# Patient Record
Sex: MALE | Race: WHITE | Employment: FULL TIME | URBAN - METROPOLITAN AREA
[De-identification: names, ages, dates, MRNs, and addresses within clinical notes are randomized per-mention and may not be internally consistent; named-entity substitution may affect disease eponyms.]

---

## 2017-03-15 ENCOUNTER — OFFICE VISIT (OUTPATIENT)
Dept: FAMILY MEDICINE CLINIC | Facility: CLINIC | Age: 47
End: 2017-03-15

## 2017-03-15 VITALS — DIASTOLIC BLOOD PRESSURE: 80 MMHG | SYSTOLIC BLOOD PRESSURE: 122 MMHG | WEIGHT: 163 LBS

## 2017-03-15 DIAGNOSIS — K64.0 FIRST DEGREE HEMORRHOIDS: Primary | ICD-10-CM

## 2017-03-15 DIAGNOSIS — S03.00XA TMJ (DISLOCATION OF TEMPOROMANDIBULAR JOINT), INITIAL ENCOUNTER: ICD-10-CM

## 2017-03-15 PROCEDURE — 99213 OFFICE O/P EST LOW 20 MIN: CPT | Performed by: FAMILY MEDICINE

## 2017-03-15 RX ORDER — NAPROXEN SODIUM 550 MG/1
550 TABLET ORAL 2 TIMES DAILY WITH MEALS
Qty: 60 TABLET | Refills: 1 | Status: SHIPPED | OUTPATIENT
Start: 2017-03-15 | End: 2020-11-23

## 2017-03-15 NOTE — PROGRESS NOTES
[facility] Office Note  Chief Complaint:   Patient presents with:  Ear Problem: right bothering him when he chews, also c\o hemorrhoids      HPI:   This is a 55year old male coming in for pain r tmj    No results found hemorrhoids. BACK: No tenderness, no spasm, SLR test negative, FROM. EXTREMITIES:  No edema, no cyanosis, no clubbing, FROM, 2+ dorsalis pedis pulses bilaterally. NEURO:  No deficit, normal gait, strength and tone, sensory intact, normal reflexes.     AS

## 2017-03-16 ENCOUNTER — TELEPHONE (OUTPATIENT)
Dept: FAMILY MEDICINE CLINIC | Facility: CLINIC | Age: 47
End: 2017-03-16

## 2017-03-17 ENCOUNTER — TELEPHONE (OUTPATIENT)
Dept: FAMILY MEDICINE CLINIC | Facility: CLINIC | Age: 47
End: 2017-03-17

## 2017-03-20 ENCOUNTER — NURSE ONLY (OUTPATIENT)
Dept: FAMILY MEDICINE CLINIC | Facility: CLINIC | Age: 47
End: 2017-03-20

## 2017-03-20 DIAGNOSIS — Z00.00 BLOOD TESTS FOR ROUTINE GENERAL PHYSICAL EXAMINATION: Primary | ICD-10-CM

## 2017-03-20 LAB
ALBUMIN SERPL BCP-MCNC: 4.2 G/DL (ref 3.5–4.8)
ALBUMIN/GLOB SERPL: 1.4 {RATIO} (ref 1–2)
ALP SERPL-CCNC: 77 U/L (ref 32–100)
ALT SERPL-CCNC: 49 U/L (ref 17–63)
ANION GAP SERPL CALC-SCNC: 7 MMOL/L (ref 0–18)
AST SERPL-CCNC: 33 U/L (ref 15–41)
BASOPHILS # BLD: 0.1 K/UL (ref 0–0.2)
BASOPHILS NFR BLD: 1 %
BILIRUB SERPL-MCNC: 0.7 MG/DL (ref 0.3–1.2)
BUN SERPL-MCNC: 15 MG/DL (ref 8–20)
BUN/CREAT SERPL: 20 (ref 10–20)
CALCIUM SERPL-MCNC: 9.3 MG/DL (ref 8.5–10.5)
CHLORIDE SERPL-SCNC: 106 MMOL/L (ref 95–110)
CHOLEST SERPL-MCNC: 179 MG/DL (ref 110–200)
CO2 SERPL-SCNC: 26 MMOL/L (ref 22–32)
CREAT SERPL-MCNC: 0.75 MG/DL (ref 0.5–1.5)
EOSINOPHIL # BLD: 0.3 K/UL (ref 0–0.7)
EOSINOPHIL NFR BLD: 5 %
ERYTHROCYTE [DISTWIDTH] IN BLOOD BY AUTOMATED COUNT: 14.6 % (ref 11–15)
GLOBULIN PLAS-MCNC: 2.9 G/DL (ref 2.5–3.7)
GLUCOSE SERPL-MCNC: 100 MG/DL (ref 70–99)
HCT VFR BLD AUTO: 45 % (ref 41–52)
HDLC SERPL-MCNC: 44 MG/DL
HGB BLD-MCNC: 14.7 G/DL (ref 13.5–17.5)
LDLC SERPL CALC-MCNC: 106 MG/DL (ref 0–99)
LYMPHOCYTES # BLD: 2.5 K/UL (ref 1–4)
LYMPHOCYTES NFR BLD: 38 %
MCH RBC QN AUTO: 28.9 PG (ref 27–32)
MCHC RBC AUTO-ENTMCNC: 32.6 G/DL (ref 32–37)
MCV RBC AUTO: 88.8 FL (ref 80–100)
MONOCYTES # BLD: 0.5 K/UL (ref 0–1)
MONOCYTES NFR BLD: 8 %
NEUTROPHILS # BLD AUTO: 3.1 K/UL (ref 1.8–7.7)
NEUTROPHILS NFR BLD: 48 %
NONHDLC SERPL-MCNC: 135 MG/DL
OSMOLALITY UR CALC.SUM OF ELEC: 289 MOSM/KG (ref 275–295)
PLATELET # BLD AUTO: 227 K/UL (ref 140–400)
PMV BLD AUTO: 9.2 FL (ref 7.4–10.3)
POTASSIUM SERPL-SCNC: 4.7 MMOL/L (ref 3.3–5.1)
PROT SERPL-MCNC: 7.1 G/DL (ref 5.9–8.4)
RBC # BLD AUTO: 5.07 M/UL (ref 4.5–5.9)
SODIUM SERPL-SCNC: 139 MMOL/L (ref 136–144)
TRIGL SERPL-MCNC: 146 MG/DL (ref 1–149)
WBC # BLD AUTO: 6.5 K/UL (ref 4–11)

## 2017-03-20 PROCEDURE — 36415 COLL VENOUS BLD VENIPUNCTURE: CPT | Performed by: FAMILY MEDICINE

## 2017-03-20 PROCEDURE — 80053 COMPREHEN METABOLIC PANEL: CPT | Performed by: FAMILY MEDICINE

## 2017-03-20 PROCEDURE — 85025 COMPLETE CBC W/AUTO DIFF WBC: CPT | Performed by: FAMILY MEDICINE

## 2017-03-20 PROCEDURE — 80061 LIPID PANEL: CPT | Performed by: FAMILY MEDICINE

## 2017-04-10 ENCOUNTER — OFFICE VISIT (OUTPATIENT)
Dept: FAMILY MEDICINE CLINIC | Facility: CLINIC | Age: 47
End: 2017-04-10

## 2017-04-10 VITALS
SYSTOLIC BLOOD PRESSURE: 110 MMHG | BODY MASS INDEX: 25.9 KG/M2 | HEIGHT: 67 IN | DIASTOLIC BLOOD PRESSURE: 70 MMHG | WEIGHT: 165 LBS

## 2017-04-10 DIAGNOSIS — Z00.00 ROUTINE GENERAL MEDICAL EXAMINATION AT A HEALTH CARE FACILITY: Primary | ICD-10-CM

## 2017-04-10 PROCEDURE — 99396 PREV VISIT EST AGE 40-64: CPT | Performed by: FAMILY MEDICINE

## 2017-04-10 RX ORDER — OLOPATADINE HYDROCHLORIDE 2 MG/ML
2 SOLUTION/ DROPS OPHTHALMIC ONCE
Qty: 1 BOTTLE | Refills: 5 | Status: SHIPPED | OUTPATIENT
Start: 2017-04-10 | End: 2017-04-10

## 2017-04-10 NOTE — PROGRESS NOTES
CC: Annual Physical Exam    HPI:   Avani Riojas is a 55year old male who presents for a complete physical exam.    Wt Readings from Last 6 Encounters:  04/10/17 : 165 lb  03/15/17 : 163 lb    Body mass index is 25.84 kg/(m^2).        Results for orders emre Acetate 25 MG Rectal Suppos Place 1 suppository (25 mg total) rectally 2 (two) times daily.  Disp: 14 suppository Rfl: 2   FLUTICASONE PROPIONATE 50 MCG/ACT Nasal Suspension PLACE 2 SPRAYS NASALLY TWICE DAILY Disp: 1 Bottle Rfl: 0      No Known Allergies Height as of this encounter: 67\". Weight as of this encounter: 165 lb. Vital signs reviewed. Appears stated age, well groomed. Physical Exam:  GEN:  Patient is alert, awake and oriented, well developed, well nourished male, no apparent distress.   GEOVANNY encounter diagnosis)    Meds & Refills for this Visit:  No prescriptions requested or ordered in this encounter    Imaging & Consults:  None    The patient is asked to return in 6 months. Adam Snyder

## 2017-08-16 ENCOUNTER — OFFICE VISIT (OUTPATIENT)
Dept: FAMILY MEDICINE CLINIC | Facility: CLINIC | Age: 47
End: 2017-08-16

## 2017-08-16 VITALS
SYSTOLIC BLOOD PRESSURE: 102 MMHG | BODY MASS INDEX: 24.96 KG/M2 | HEIGHT: 67 IN | WEIGHT: 159 LBS | DIASTOLIC BLOOD PRESSURE: 70 MMHG

## 2017-08-16 DIAGNOSIS — M17.0 OSTEOARTHRITIS OF BOTH KNEES, UNSPECIFIED OSTEOARTHRITIS TYPE: Primary | ICD-10-CM

## 2017-08-16 PROCEDURE — 99212 OFFICE O/P EST SF 10 MIN: CPT | Performed by: FAMILY MEDICINE

## 2017-08-16 NOTE — PROGRESS NOTES
[facility] Office Note  Chief Complaint:   Patient presents with:  Knee Pain      HPI:   This is a 55year old male coming in for      Results for orders placed or performed in visit on 25/81/58  -COMP METABOLIC PANEL ( Smokeless tobacco: Never Used                      Alcohol use:  No              Family History:  Family History   Problem Relation Age of Onset   • Hypertension Mother    • Kidney Disease Mother      Allergies:  No Known Allergies  Current Meds:    Cur No deficit, normal gait, strength and tone, sensory intact, normal reflexes. ASSESSMENT AND PLAN:   There are no diagnoses linked to this encounter.     Meds & Refills for this Visit:  No prescriptions requested or ordered in this encounter    Janie Arellano

## 2017-08-21 ENCOUNTER — TELEPHONE (OUTPATIENT)
Dept: FAMILY MEDICINE CLINIC | Facility: CLINIC | Age: 47
End: 2017-08-21

## 2017-08-21 NOTE — TELEPHONE ENCOUNTER
Patient wants to know if Dr. Som Garcia can prescribe a medication for allergies. He states he gets a rash and its very itchy. The rash is on his legs.

## 2017-08-23 ENCOUNTER — OFFICE VISIT (OUTPATIENT)
Dept: FAMILY MEDICINE CLINIC | Facility: CLINIC | Age: 47
End: 2017-08-23

## 2017-08-23 VITALS
HEART RATE: 63 BPM | RESPIRATION RATE: 16 BRPM | WEIGHT: 154 LBS | HEIGHT: 67 IN | OXYGEN SATURATION: 98 % | TEMPERATURE: 98 F | BODY MASS INDEX: 24.17 KG/M2 | DIASTOLIC BLOOD PRESSURE: 60 MMHG | SYSTOLIC BLOOD PRESSURE: 110 MMHG

## 2017-08-23 DIAGNOSIS — S80.861A INSECT BITE (NONVENOMOUS), RIGHT LOWER LEG, INITIAL ENCOUNTER (CODE): ICD-10-CM
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
## 2017-08-23 DIAGNOSIS — S40.861A INSECT BITE OF RIGHT ARM, INITIAL ENCOUNTER: ICD-10-CM

## 2017-08-23 DIAGNOSIS — S80.862A INSECT BITE (NONVENOMOUS), LEFT LOWER LEG, INITIAL ENCOUNTER (CODE): Primary | ICD-10-CM

## 2017-08-23 PROCEDURE — 99202 OFFICE O/P NEW SF 15 MIN: CPT | Performed by: NURSE PRACTITIONER

## 2017-08-23 NOTE — PATIENT INSTRUCTIONS
May take a zyrtec or Claritin daily. May take benadryl at night if needed. Do not take benadryl if you need to operate a car or heavy machinery as it can make you drowsy or dizzy. May take calamine lotion for itching.      Inspect daily for signs and · To help relieve itching and swelling, apply ice in a zip-top plastic bag wrapped in a thin towel to the bites. Do this for up to 10 minutes at a time. Avoid hot showers or baths as these tend to make itching worse.   · An over-the-counter anti-itch medici · Wash the area with soap and water once a day. Watch for any signs of infection. · If itching is a problem, you may use an over-the-counter anti-itch spray or cream, such as any medicine with benzocaine in it.  You may also put an ice pack on the bite are · If you are outdoors when mosquitoes are active, wear socks, long sleeves, and long pants, and use insect repellent. The most effective insect repellent is DEET (10% to 30%). Children should not use more than 10% strength.  Don't put DEET on children's myers CETIRIZINE (se SUDHEER onofre) is an antihistamine. This medicine is used to treat or prevent symptoms of allergies. It is also used to help reduce itchy skin rash and hives. How should I use this medicine? Take this medicine by mouth with a glass of water. What should I tell my health care provider before I take this medicine?   They need to know if you have any of these conditions:  · kidney disease  · liver disease  · an unusual or allergic reaction to cetirizine, hydroxyzine, other medicines, foods, dyes,

## 2017-08-23 NOTE — PROGRESS NOTES
CHIEF COMPLAINT:   Patient presents with:  Derm Problem         HPI:   Lucretia Steen is a 55year old male who presents for [id number] of a rash. Per patient rash started in the past 3 days. Rash has been improved since onset.   Patient not had similar barbie Smoking status: Never Smoker                                                              Smokeless tobacco: Never Used                      Alcohol use: No                  REVIEW OF SYSTEMS:   GENERAL: feels well otherwise, no fever, no chills.   SKIN: Pe Insect bite of right arm, initial encounter    PLAN: Meds as listed below. Comfort measures as described in Patient Instructions. Skin care discussed with patient.      Meds & Refills for this Visit:    No prescriptions requested or ordered in this encoun Insects most often bite to protect themselves or their nests. Certain bugs, like fleas and mosquitoes, bite to feed. In some cases, the actual bite causes no pain. An itchy red welt or swelling may develop at the site of the bite.  Most insect bites do not Follow-up care  Follow up with your healthcare provider, or as advised.   Call 911  Call 911 if any of these occur:  · Trouble breathing or swallowing  · Wheezing  · Feeling like your throat is closing up  · Fainting, loss of consciousness  · Swelling aroun · If itching is a problem, you may use an over-the-counter anti-itch spray or cream, such as any medicine with benzocaine in it. You may also put an ice pack on the bite area as needed to relieve pain, itching, or swelling.  Use it for 20 minutes every few · You can spray your clothing with a repellent containing DEET or permethrin. If you do this, you do not need to put repellent on the skin under clothing that has been sprayed.   · The CDC also recommends the following as alternate mosquito repellents: eagle Talk to your pediatrician regarding the use of this medicine in children. Special care may be needed. While this drug may be prescribed for children as young as 10years of age for selected conditions, precautions do apply.   What side effects may I notice f You may get drowsy or dizzy. Do not drive, use machinery, or do anything that needs mental alertness until you know how this medicine affects you. Do not stand or sit up quickly, especially if you are an older patient.  This reduces the risk of dizzy or pablito

## 2017-09-07 ENCOUNTER — OFFICE VISIT (OUTPATIENT)
Dept: FAMILY MEDICINE CLINIC | Facility: CLINIC | Age: 47
End: 2017-09-07

## 2017-09-07 VITALS
OXYGEN SATURATION: 98 % | BODY MASS INDEX: 25.11 KG/M2 | TEMPERATURE: 98 F | RESPIRATION RATE: 16 BRPM | DIASTOLIC BLOOD PRESSURE: 68 MMHG | WEIGHT: 160 LBS | SYSTOLIC BLOOD PRESSURE: 100 MMHG | HEART RATE: 58 BPM | HEIGHT: 67 IN

## 2017-09-07 DIAGNOSIS — H10.13 ALLERGIC CONJUNCTIVITIS OF BOTH EYES: Primary | ICD-10-CM

## 2017-09-07 PROCEDURE — 99212 OFFICE O/P EST SF 10 MIN: CPT | Performed by: NURSE PRACTITIONER

## 2017-09-07 NOTE — PROGRESS NOTES
CHIEF COMPLAINT:   Patient presents with:  Conjunctivitis: itching eyes, redness, and discharge X 1 week      HPI:   Juna Councilman is a 55year old male who presents with chief complaint of \"pink eye\". Symptoms began  1  weeks ago.  Symptoms have been wors /68   Pulse 58   Temp 97.6 °F (36.4 °C) (Oral)   Resp 16   Ht 67\"   Wt 160 lb   SpO2 98%   BMI 25.06 kg/m²   GENERAL: well developed, well nourished,in no apparent distress  SKIN: no rashes,no suspicious lesions  EYES: PERRLA, EOMI, Conjunctiva is n Conjunctivitis is an irritation of a thin membrane in the eye. This membrane is called the conjunctiva. It covers the white of the eye and the inside of the eyelid. The condition is often known as “pink eye” or “red eye” because the eye looks pink or red. © 6909-4583 [address], [address] [place]. All rights reserved. This information is not intended as a substitute for professional medical care. Always follow your healthcare professional's instructions.         Bradley Vicente Store at room temperature between 2 and 30 degrees C (36 and 86 degrees F). Protect from moisture. Throw away any unused medicine after the expiration date. What should I tell my health care provider before I take this medicine?   They need to know if you

## 2017-09-07 NOTE — PATIENT INSTRUCTIONS
Continue Pataday as directed on packaging    May try OTC claritin or zyrtec as directed on packaging. May also use flonase Daily as directed on packaging. Conjunctivitis, Allergic    Conjunctivitis is an irritation of a thin membrane in the eye. · Increased eyelid swelling  · New or worsening drainage from the eye  · Increasing redness around the eye  · Facial swelling  Date Last Reviewed: 6/14/2015  © 4895-1336 The [address] [facility] [place], [address] [place], [place] [address].  All rights reser Keep out of the reach of children. Store at room temperature between 2 and 30 degrees C (36 and 86 degrees F). Protect from moisture. Throw away any unused medicine after the expiration date.   What should I tell my health care provider before I take this

## 2017-09-09 ENCOUNTER — TELEPHONE (OUTPATIENT)
Dept: FAMILY MEDICINE CLINIC | Facility: CLINIC | Age: 47
End: 2017-09-09

## 2017-09-09 DIAGNOSIS — H10.9 CONJUNCTIVITIS OF BOTH EYES, UNSPECIFIED CONJUNCTIVITIS TYPE: Primary | ICD-10-CM

## 2017-09-09 RX ORDER — OFLOXACIN 3 MG/ML
1 SOLUTION/ DROPS OPHTHALMIC 4 TIMES DAILY
Qty: 1 BOTTLE | Refills: 0 | Status: SHIPPED | OUTPATIENT
Start: 2017-09-09 | End: 2018-11-21

## 2017-09-09 NOTE — TELEPHONE ENCOUNTER
Pt reports no improvement. PT reports increasing redness, discharge, and itching. Pt denies he wears contact lenses. No change in vision or eye pain. Will try ofloxacin eye drops as pt has tried OTC medication with no improvement.  Discussed risks, benefits

## 2018-02-09 ENCOUNTER — APPOINTMENT (OUTPATIENT)
Dept: ORTHOPEDIC SURGERY | Facility: CLINIC | Age: 48
End: 2018-02-09
Payer: OTHER GOVERNMENT

## 2018-02-09 VITALS — BODY MASS INDEX: 37.77 KG/M2 | HEIGHT: 69 IN | WEIGHT: 255 LBS

## 2018-02-09 DIAGNOSIS — Z78.9 OTHER SPECIFIED HEALTH STATUS: ICD-10-CM

## 2018-02-09 DIAGNOSIS — G89.29 PAIN IN LEFT HIP: ICD-10-CM

## 2018-02-09 DIAGNOSIS — Z86.79 PERSONAL HISTORY OF OTHER DISEASES OF THE CIRCULATORY SYSTEM: ICD-10-CM

## 2018-02-09 DIAGNOSIS — M25.552 PAIN IN LEFT HIP: ICD-10-CM

## 2018-02-09 DIAGNOSIS — Z00.00 ENCOUNTER FOR GENERAL ADULT MEDICAL EXAMINATION W/OUT ABNORMAL FINDINGS: ICD-10-CM

## 2018-02-09 PROCEDURE — 99203 OFFICE O/P NEW LOW 30 MIN: CPT

## 2018-02-09 PROCEDURE — 73502 X-RAY EXAM HIP UNI 2-3 VIEWS: CPT | Mod: LT

## 2018-02-09 RX ORDER — AMLODIPINE BESYLATE 10 MG/1
10 TABLET ORAL
Qty: 30 | Refills: 0 | Status: ACTIVE | COMMUNITY
Start: 2017-09-07

## 2018-02-09 RX ORDER — IRBESARTAN AND HYDROCHLOROTHIAZIDE 150; 12.5 MG/1; MG/1
150-12.5 TABLET, FILM COATED ORAL
Refills: 0 | Status: ACTIVE | COMMUNITY

## 2018-02-09 RX ORDER — IRBESARTAN AND HYDROCHLOROTHIAZIDE 150; 12.5 MG/1; MG/1
150-12.5 TABLET ORAL
Qty: 30 | Refills: 0 | Status: ACTIVE | COMMUNITY
Start: 2017-09-07

## 2020-11-24 PROBLEM — R73.9 BLOOD GLUCOSE ELEVATED: Status: ACTIVE | Noted: 2020-11-24

## 2022-03-11 ENCOUNTER — LAB REQUISITION (OUTPATIENT)
Dept: SURGERY | Age: 52
End: 2022-03-11
Payer: COMMERCIAL

## 2022-03-12 LAB — SARS-COV-2 RNA RESP QL NAA+PROBE: NOT DETECTED

## (undated) DIAGNOSIS — Z01.818 PREOP EXAMINATION: ICD-10-CM

## (undated) NOTE — MR AVS SNAPSHOT
[address] at [facility] [place], [place]  [address]  [facility], [address] [place]               Thank you for choosing us for your health care visit with Rebecca Gutierrez MD.  We are glad to serve you and happy to provide Take 1 tablet (550 mg total) by mouth 2 (two) times daily with meals.    Commonly known as:  ANAPROX DS                Where to Get Your Medications      These medications were sent to [place], [address] Choose whole grain products Foods high in sodium   Water is best for hydration Fast food.    Eat at home when possible     Tips for increasing your physical activity – Adults who are physically active are less likely to develop some chronic diseases than ad

## (undated) NOTE — MR AVS SNAPSHOT
[address] at [facility]  [address]  [facility], [address] [place]               Thank you for choosing us for your health care visit with Hansa Mims MD.  We are glad to serve you and happy to provide [employer] will allow you to access patient instructions from your recent visit,  view other health information, and more. To sign up or find more information, go to [email or web address] and click on the Sign Up Now link in the [employer] box.      Enter